# Patient Record
Sex: MALE | Race: WHITE | NOT HISPANIC OR LATINO | Employment: FULL TIME | URBAN - METROPOLITAN AREA
[De-identification: names, ages, dates, MRNs, and addresses within clinical notes are randomized per-mention and may not be internally consistent; named-entity substitution may affect disease eponyms.]

---

## 2021-05-21 ENCOUNTER — TRANSFERRED RECORDS (OUTPATIENT)
Dept: HEALTH INFORMATION MANAGEMENT | Facility: CLINIC | Age: 41
End: 2021-05-21

## 2021-05-21 LAB
ALT SERPL-CCNC: 68 U/L (ref 46–116)
AST SERPL-CCNC: 16 U/L (ref 15–37)
CREATININE (EXTERNAL): 0.81 MG/DL (ref 0.55–1.3)
GFR ESTIMATED (EXTERNAL): 107 ML/MIN/1.73M2
GFR ESTIMATED (IF AFRICAN AMERICAN) (EXTERNAL): 129 ML/MIN/1.73M2
GLUCOSE (EXTERNAL): 107 MG/DL (ref 70–99)
POTASSIUM (EXTERNAL): 4.2 MMOL/L (ref 3.5–5.1)

## 2022-03-25 ENCOUNTER — TRANSFERRED RECORDS (OUTPATIENT)
Dept: HEALTH INFORMATION MANAGEMENT | Facility: CLINIC | Age: 42
End: 2022-03-25

## 2022-03-29 ENCOUNTER — MEDICAL CORRESPONDENCE (OUTPATIENT)
Dept: HEALTH INFORMATION MANAGEMENT | Facility: CLINIC | Age: 42
End: 2022-03-29
Payer: COMMERCIAL

## 2022-04-05 ENCOUNTER — TRANSCRIBE ORDERS (OUTPATIENT)
Dept: OTHER | Age: 42
End: 2022-04-05
Payer: COMMERCIAL

## 2022-04-05 DIAGNOSIS — F64.0 GENDER DYSPHORIA IN ADULT: Primary | ICD-10-CM

## 2022-04-14 ENCOUNTER — TELEPHONE (OUTPATIENT)
Dept: PLASTIC SURGERY | Facility: CLINIC | Age: 42
End: 2022-04-14
Payer: COMMERCIAL

## 2022-04-14 NOTE — TELEPHONE ENCOUNTER
[facility] Call [facility]    Phone Message    May a detailed message be left on voicemail: yes     Reason for Call: Appointment Intake    Referring Provider Name: Goldberg, Gary Steven, MD  Diagnosis and/or Symptoms: Gender Dysphoria in Adult    Action Taken: Message routed to:  [facility]    Travel Screening: Not Applicable

## 2022-06-08 ENCOUNTER — TELEPHONE (OUTPATIENT)
Dept: PLASTIC SURGERY | Facility: CLINIC | Age: 42
End: 2022-06-08
Payer: COMMERCIAL

## 2022-06-08 NOTE — TELEPHONE ENCOUNTER
[facility] :  Care Coordination Note     SITUATION   Vivien, she/her, is a 41 year old adult who is receiving support for:  Call Back (Appointment )  .    BACKGROUND     Pt scheduled consultation with Dr. Deleon for breast augmentation consultation 7/22/22.     ASSESSMENT     Surgery              CGC Assessment  [facility] ([facility]GC) Enrollment: (P) Enrolled  Patient has a therapist: (P) No  Letter of support #1: (P) Requested  Surgery being considered: (P) Yes  Augmentation: (P) Yes  Hormones at least 12mo: (P) Yes          PLAN          Nursing Interventions:      Follow-up plan:    1. Obtain GLADYS Sweeney

## 2022-06-08 NOTE — TELEPHONE ENCOUNTER
[facility] Call Center    Phone Message    May a detailed message be left on voicemail: yes     Reason for Call: Other: Jeremiah Francisco is calling on behalf of the pt to help ot got contact for a gender care concsult. Please call pt back to schedule.      Action Taken: Message routed to:  [facility] Plastic surg    Travel Screening: Not Applicable

## 2022-06-19 ENCOUNTER — HEALTH MAINTENANCE LETTER (OUTPATIENT)
Age: 42
End: 2022-06-19

## 2022-07-28 ENCOUNTER — OFFICE VISIT (OUTPATIENT)
Dept: PLASTIC SURGERY | Facility: AMBULATORY SURGERY CENTER | Age: 42
End: 2022-07-28
Attending: UROLOGY
Payer: COMMERCIAL

## 2022-07-28 VITALS — DIASTOLIC BLOOD PRESSURE: 80 MMHG | SYSTOLIC BLOOD PRESSURE: 138 MMHG

## 2022-07-28 DIAGNOSIS — F64.0 GENDER DYSPHORIA IN ADULT: ICD-10-CM

## 2022-07-28 DIAGNOSIS — Z98.82 S/P AUGMENTATION MAMMOPLASTY: Primary | ICD-10-CM

## 2022-07-28 PROCEDURE — 99204 OFFICE O/P NEW MOD 45 MIN: CPT | Performed by: PLASTIC SURGERY

## 2022-07-28 NOTE — LETTER
7/28/2022         RE: Vishal Dia  [address] [facility] [address]        Dear Colleague,    Thank you for referring your patient, Vishal Dia, to the [facility]. Please see a copy of my visit note below.    Chief complaint:  Gender dysphoria, consultation for top surgery     History of present illness:  This is a 41 year old biological male transitioning to female who comes today for consultation regarding top surgery.  Patient's preferred pronouns she/her/hers.  Patient's name is Vivien.  At this time the letter of support is ready.   Currently Vivien is on estrogen treatment.  She has been receiving estrogen for the last 5 years. Vivien has been living on chosen gender for the last 5 years.     Past medical history:  History reviewed. No pertinent past medical history.    Gender dysphoria     Past surgical history:  Left foot surgery     Allergies:  Morphine, codeine, Demerol.  Patient states that she will take Tylenol extra strength for pain control.     Medications:  No current outpatient medications on file.     Family history:  Denies family history of cancer or bleeding disorders.     Social History:  Denies tobacco, drinks alcohol socially.     Review of systems:  General ROS: No complaints or constitutional symptoms  Skin: No complaints or symptoms   Hematologic/Lymphatic: No symptoms or complaints  Psychiatric: Patient presents with gender dysphoria  Endocrine: No excessive fatigue, no hypermetabolic symptoms reported  Respiratory ROS: No cough, shortness of breath, or wheezing  Cardiovascular ROS: No chest pain or dyspnea on exertion  Breast ROS: Denies nipple discharge, denies palpable breast masses, denies peau d'orange.  Gastrointestinal ROS: No abdominal pain, nausea, diarrhea, or constipation  Musculoskeletal ROS: No recent injuries reported  Neurological ROS: No focal neurologic defects reported.       Physical exam:     /80   General: Alert,  "cooperative, appears stated age   Skin: Skin color, texture, turgor normal, no rashes or lesions   Lymphatic: No obvious adenopathy, no swelling   Eyes: No scleral icterus, pupils equal  HENT: No traumatic injury to the head or face, no gross abnormalities  Lungs: Normal respiratory effort, breath sounds equal bilaterally  Heart: Regular rate and rhythm  Breasts:  Bilateral glandular hypertrophy, with grade 1 ptosis.  No nipple inversion, no nipple discharge, no palpable breast masses.    Left nipple areolar complex is slightly higher compared to the right one.  On the right breast sternal notch to nipple distance is 23 cm, nipple to inframammary fold is 6 cm, and breast diameter is 12 cm.  Of the left breast sternal notch to nipple distance is 22 cm, nipple to inframammary fold distance is 5 cm, and breast diameter is 12 cm.  Pinch thickness test on the skin of both breasts is 3 cm.  No palpable axillary lymphadenopathies bilaterally.  Abdomen: Soft, non-distended and non-tender to palpation  Neurologic: Grossly intact                                  Assessment:     41 year old biological male transitioning to female with history of gender dysphoria.     PLAN:      I believe that Vivien is a good candidate for gender affirming top surgery with bilateral prepectoral augmentation mammoplasties (her skin pinch test thickness is greater than 2 cm), via inframammary fold approach, with lowering of the inframammary fold at least 2 cm down from its current position (soft tissue rearrangement).  Furthermore, she will need obliteration of the current inframammary fold which is at least 1 cm higher from where is supposed to be.  Finally, she will need scoring of her bilateral glands.  Based upon her breast diameter I will bring to the operating room full profile, cohesive, breast implants with 520 cc, 485 cc, 450 cc and 415 cc.  I will utilize corresponding sizers as well.     \"According to [place] case law and [facility] " "standards of care, with an appropriate letter of support from a mental health provider, top surgery/mastectomy is medically necessary for the treatment of gender dysphoria.\"     I discussed with Vivien  the risks of surgery and they include but are not limited to scarring, large scar in the inframammary fold, keloid formation, infection requiring explantation and secondary augmentation at least 6 to 12 months after explantation, bleeding, hematoma, seroma, contour deformities, lack of sensation on the chest and nipple areolar complex, capsular contracture, implant malposition, need for further surgeries.     Patient did acknowledge all of these risks and has agreed to proceed.     We will schedule her for surgery: bilateral prepectoral augmentation mammoplasties with lowering of bilateral inframammary folds as a soft tissue rearrangement.     Time spent with the patient 45 minutes.       Abdon Deleon MD , FACS   Diplomate American Board of Plastic Surgery  Diplomate American Board of Surgery  Adj. Assistant Professor of Surgery  Division of Plastic & Reconstructive Surgery   [facility]  Office: [phone]   7/28/2022 at 10:55 AM         Again, thank you for allowing me to participate in the care of your patient.        Sincerely,        Abdon Deleon MD    "

## 2022-07-28 NOTE — PROGRESS NOTES
Chief complaint:  Gender dysphoria, consultation for top surgery     History of present illness:  This is a 41 year old biological male transitioning to female who comes today for consultation regarding top surgery.  Patient's preferred pronouns she/her/hers.  Patient's name is Vivien.  At this time the letter of support is ready.   Currently Vivien is on estrogen treatment.  She has been receiving estrogen for the last 5 years. Vivien has been living on chosen gender for the last 5 years.     Past medical history:  History reviewed. No pertinent past medical history.    Gender dysphoria     Past surgical history:  Left foot surgery     Allergies:  Morphine, codeine, Demerol.  Patient states that she will take Tylenol extra strength for pain control.     Medications:  No current outpatient medications on file.     Family history:  Denies family history of cancer or bleeding disorders.     Social History:  Denies tobacco, drinks alcohol socially.     Review of systems:  General ROS: No complaints or constitutional symptoms  Skin: No complaints or symptoms   Hematologic/Lymphatic: No symptoms or complaints  Psychiatric: Patient presents with gender dysphoria  Endocrine: No excessive fatigue, no hypermetabolic symptoms reported  Respiratory ROS: No cough, shortness of breath, or wheezing  Cardiovascular ROS: No chest pain or dyspnea on exertion  Breast ROS: Denies nipple discharge, denies palpable breast masses, denies peau d'orange.  Gastrointestinal ROS: No abdominal pain, nausea, diarrhea, or constipation  Musculoskeletal ROS: No recent injuries reported  Neurological ROS: No focal neurologic defects reported.       Physical exam:     /80   General: Alert, cooperative, appears stated age   Skin: Skin color, texture, turgor normal, no rashes or lesions   Lymphatic: No obvious adenopathy, no swelling   Eyes: No scleral icterus, pupils equal  HENT: No traumatic injury to the head or face, no gross  "abnormalities  Lungs: Normal respiratory effort, breath sounds equal bilaterally  Heart: Regular rate and rhythm  Breasts:  Bilateral glandular hypertrophy, with grade 1 ptosis.  No nipple inversion, no nipple discharge, no palpable breast masses.    Left nipple areolar complex is slightly higher compared to the right one.  On the right breast sternal notch to nipple distance is 23 cm, nipple to inframammary fold is 6 cm, and breast diameter is 12 cm.  Of the left breast sternal notch to nipple distance is 22 cm, nipple to inframammary fold distance is 5 cm, and breast diameter is 12 cm.  Pinch thickness test on the skin of both breasts is 3 cm.  No palpable axillary lymphadenopathies bilaterally.  Abdomen: Soft, non-distended and non-tender to palpation  Neurologic: Grossly intact                                  Assessment:     41 year old biological male transitioning to female with history of gender dysphoria.     PLAN:      I believe that Vivien is a good candidate for gender affirming top surgery with bilateral prepectoral augmentation mammoplasties (her skin pinch test thickness is greater than 2 cm), via inframammary fold approach, with lowering of the inframammary fold at least 2 cm down from its current position (soft tissue rearrangement).  Furthermore, she will need obliteration of the current inframammary fold which is at least 1 cm higher from where is supposed to be.  Finally, she will need scoring of her bilateral glands.  Based upon her breast diameter I will bring to the operating room full profile, cohesive, breast implants with 520 cc, 485 cc, 450 cc and 415 cc.  I will utilize corresponding sizers as well.     \"According to [place] case law and [facility] standards of care, with an appropriate letter of support from a mental health provider, top surgery/mastectomy is medically necessary for the treatment of gender dysphoria.\"     I discussed with Vivien  the risks of surgery and they include but are " not limited to scarring, large scar in the inframammary fold, keloid formation, infection requiring explantation and secondary augmentation at least 6 to 12 months after explantation, bleeding, hematoma, seroma, contour deformities, lack of sensation on the chest and nipple areolar complex, capsular contracture, implant malposition, need for further surgeries.     Patient did acknowledge all of these risks and has agreed to proceed.     We will schedule her for surgery: bilateral prepectoral augmentation mammoplasties with lowering of bilateral inframammary folds as a soft tissue rearrangement.     Time spent with the patient 45 minutes.       Abdon Deleon MD , FACS   Diplomate American Board of Plastic Surgery  Diplomate American Board of Surgery  Adj. Assistant Professor of Surgery  Division of Plastic & Reconstructive Surgery   [facility]  Office: [phone]   7/28/2022 at 10:55 AM

## 2022-09-30 ENCOUNTER — DOCUMENTATION ONLY (OUTPATIENT)
Dept: PLASTIC SURGERY | Facility: CLINIC | Age: 42
End: 2022-09-30

## 2022-09-30 NOTE — PROGRESS NOTES
[facility] :  Care Coordination Note     SITUATION   Vivien Dia is a 41 year old adult who is receiving support for:  No chief complaint on file.  .    BACKGROUND     Reviewed LOS. Missing irreversibility statement per [employer] policy. Sent pt a message indicating need for updated LOS.     ASSESSMENT     Surgery              CGC Assessment  [facility] ([facility]) Enrollment: Enrolled  Patient has a therapist: Yes  Name of therapist: Joselito Dennison  Letter of support #1: Received  Letter #1 Date: 07/12/22  Surgery being considered: Yes  Augmentation: Yes          PLAN          Nursing Interventions:       Follow-up plan:  Pt to upload updated letter, surgery to be scheduled.        LIANET MONTGOMERY LP[facility]

## 2022-10-19 ENCOUNTER — MEDICAL CORRESPONDENCE (OUTPATIENT)
Dept: HEALTH INFORMATION MANAGEMENT | Facility: CLINIC | Age: 42
End: 2022-10-19

## 2022-11-04 ENCOUNTER — TELEPHONE (OUTPATIENT)
Dept: PLASTIC SURGERY | Facility: AMBULATORY SURGERY CENTER | Age: 42
End: 2022-11-04

## 2022-11-04 PROBLEM — F64.0 GENDER DYSPHORIA IN ADULT: Status: ACTIVE | Noted: 2022-11-04

## 2022-11-04 NOTE — LETTER
We've received instruction to get you scheduled for surgery with Dr Deleon. We have that arranged as follows:     Pre-op Physical:  Call your primary clinic to schedule.    Surgery Date: 12/7/2022     [facility]  [address], [address], [place]  [address]    Approximate Arrival Time: 8:00 am  (Unless instructed differently by the pre-op call nurse)     Post op Appointment: 12/9/2022 at 4:00 pm at Dr Deleon's office in [place].  [facility], [address], [place] [address].    Prep Tasks and Info:     1. Schedule a pre-op physical with your primary care doctor. This must be within 30 days of surgery and since it required by anesthesia, your surgery will be cancelled if it's not done. Call your clinic asap to get this scheduled.    2. Review your medications with your primary care or prescribing physician; they will advise you which meds to stop and when, and when you can resume taking.  Certain medications like blood thinners need to be stopped in advance of surgery to proceed safely.    3. A home rapid antigen Covid-19 test is required 1-2 days before surgery- regardless of your vaccination status.  Take a photo of the negative result and show to the nurse on the day of surgery. If you test positive, contact our office right away to reschedule surgery. You can buy a home Covid-19 Rapid Antigen test at many local pharmacies, or you can order for free at covid.gov/tests.    4. Please shower the evening before and morning of surgery with Hibiclens or Exidine soap.  This can be found at your local pharmacy.    5. Fasting instructions will be provided by the pre-op nurse who will call you 1-3 days before surgery.  Typically we advise normal food up to 8 hours before surgery then clear liquids only up to 1 hour before surgery then nothing at all by mouth for 2 hours including no gum or  candy.  The nurse will review your specific instructions with you at the call.      6. Smoking impacts your body's ability to heal properly.  Dr Deleon patients are required to be nicotine free for 6-8 weeks before surgery.      7. You will need an adult to drive you home and stay with you 24 hours after surgery. Public transportation or Medical Van Services are not permitted.    8. You may have two visitors wait in the lobby at the surgery center during your surgery. Visitor restrictions are subject to change, please verify with the pre-op nurse when they call.    9. If the community sees a new COVID19 surge, your procedure may need to be postponed. We will contact you if this happens. You will be screened for high-risk exposure to Covid-19 during the pre-op call.  We encourage you to quarantine yourself away from any Covid-19 people for 10 days before surgery to avoid possible last minute cancellations.   When you arrive to the surgery center, you will again be screened for COVID19 symptoms. If you screen positive, your surgery will need to be postponed.    10. We always encourage you to notify your insurance any time you have medical tests or procedures scheduled including surgery. The number is usually right on the back of your insurance card. Please call [facility] Cost of Care at [phone] if you'd like a surgery quote.       Call our office if you have any questions! Thank you!

## 2022-11-04 NOTE — TELEPHONE ENCOUNTER
Spoke with patient today regarding surgery scheduling     Went over details/instructions.    Surgery Letter sent via [employer]

## 2022-11-19 ENCOUNTER — HEALTH MAINTENANCE LETTER (OUTPATIENT)
Age: 42
End: 2022-11-19

## 2022-12-04 ENCOUNTER — MYC MEDICAL ADVICE (OUTPATIENT)
Dept: SURGERY | Facility: AMBULATORY SURGERY CENTER | Age: 42
End: 2022-12-04

## 2022-12-04 RX ORDER — ONDANSETRON 4 MG/1
4 TABLET, FILM COATED ORAL EVERY 6 HOURS PRN
Qty: 12 TABLET | Refills: 0 | Status: SHIPPED | OUTPATIENT
Start: 2022-12-04

## 2022-12-04 RX ORDER — DOCUSATE SODIUM 100 MG/1
100 CAPSULE, LIQUID FILLED ORAL 2 TIMES DAILY
Qty: 20 CAPSULE | Refills: 0 | Status: SHIPPED | OUTPATIENT
Start: 2022-12-04

## 2022-12-04 RX ORDER — CEPHALEXIN 500 MG/1
500 CAPSULE ORAL 3 TIMES DAILY
Qty: 21 CAPSULE | Refills: 0 | Status: SHIPPED | OUTPATIENT
Start: 2022-12-04 | End: 2022-12-11

## 2022-12-04 RX ORDER — MUPIROCIN 20 MG/G
OINTMENT TOPICAL 3 TIMES DAILY
Qty: 30 G | Refills: 0 | Status: SHIPPED | OUTPATIENT
Start: 2022-12-04 | End: 2022-12-07

## 2022-12-05 NOTE — PROGRESS NOTES
Preop medications has been prescribed.    Abdon Deleon MD , FACS   Diplomate American Board of Plastic Surgery  Diplomate American Board of Surgery  Adj. Assistant Professor of Surgery  Division of Plastic & Reconstructive Surgery   [facility]  Office: [phone]   12/4/2022 at 6:47 PM

## 2022-12-06 ENCOUNTER — ANESTHESIA EVENT (OUTPATIENT)
Dept: SURGERY | Facility: AMBULATORY SURGERY CENTER | Age: 42
End: 2022-12-06
Payer: COMMERCIAL

## 2022-12-07 ENCOUNTER — HOSPITAL ENCOUNTER (OUTPATIENT)
Facility: AMBULATORY SURGERY CENTER | Age: 42
Discharge: HOME | End: 2022-12-07
Attending: PLASTIC SURGERY | Admitting: PLASTIC SURGERY
Payer: COMMERCIAL

## 2022-12-07 ENCOUNTER — ANESTHESIA (OUTPATIENT)
Dept: SURGERY | Facility: AMBULATORY SURGERY CENTER | Age: 42
End: 2022-12-07
Payer: COMMERCIAL

## 2022-12-07 VITALS
RESPIRATION RATE: 16 BRPM | HEIGHT: 73 IN | OXYGEN SATURATION: 98 % | BODY MASS INDEX: 26.51 KG/M2 | TEMPERATURE: 96.3 F | DIASTOLIC BLOOD PRESSURE: 73 MMHG | HEART RATE: 72 BPM | WEIGHT: 200 LBS | SYSTOLIC BLOOD PRESSURE: 124 MMHG

## 2022-12-07 DIAGNOSIS — F64.0 GENDER DYSPHORIA IN ADULT: ICD-10-CM

## 2022-12-07 PROCEDURE — 19325 BREAST AUGMENTATION W/IMPLT: CPT | Mod: 50

## 2022-12-07 PROCEDURE — 14001 TIS TRNFR TRUNK 10.1-30SQCM: CPT | Mod: XS

## 2022-12-07 PROCEDURE — 19325 BREAST AUGMENTATION W/IMPLT: CPT | Mod: 50 | Performed by: PLASTIC SURGERY

## 2022-12-07 PROCEDURE — 14001 TIS TRNFR TRUNK 10.1-30SQCM: CPT | Mod: GC | Performed by: PLASTIC SURGERY

## 2022-12-07 DEVICE — IMPLANTABLE DEVICE: Type: IMPLANTABLE DEVICE | Site: BREAST | Status: FUNCTIONAL

## 2022-12-07 RX ORDER — ACETAMINOPHEN 325 MG/1
975 TABLET ORAL ONCE
Status: COMPLETED | OUTPATIENT
Start: 2022-12-07 | End: 2022-12-07

## 2022-12-07 RX ORDER — FENTANYL CITRATE 50 UG/ML
25 INJECTION, SOLUTION INTRAMUSCULAR; INTRAVENOUS EVERY 5 MIN PRN
Status: DISCONTINUED | OUTPATIENT
Start: 2022-12-07 | End: 2022-12-08 | Stop reason: HOSPADM

## 2022-12-07 RX ORDER — PROPOFOL 10 MG/ML
INJECTION, EMULSION INTRAVENOUS PRN
Status: DISCONTINUED | OUTPATIENT
Start: 2022-12-07 | End: 2022-12-07

## 2022-12-07 RX ORDER — ONDANSETRON 4 MG/1
4 TABLET, ORALLY DISINTEGRATING ORAL EVERY 30 MIN PRN
Status: DISCONTINUED | OUTPATIENT
Start: 2022-12-07 | End: 2022-12-08 | Stop reason: HOSPADM

## 2022-12-07 RX ORDER — FENTANYL CITRATE 50 UG/ML
INJECTION, SOLUTION INTRAMUSCULAR; INTRAVENOUS PRN
Status: DISCONTINUED | OUTPATIENT
Start: 2022-12-07 | End: 2022-12-07

## 2022-12-07 RX ORDER — PROPOFOL 10 MG/ML
INJECTION, EMULSION INTRAVENOUS CONTINUOUS PRN
Status: DISCONTINUED | OUTPATIENT
Start: 2022-12-07 | End: 2022-12-07

## 2022-12-07 RX ORDER — CEFAZOLIN SODIUM 2 G/50ML
2 SOLUTION INTRAVENOUS
Status: COMPLETED | OUTPATIENT
Start: 2022-12-07 | End: 2022-12-07

## 2022-12-07 RX ORDER — GLYCOPYRROLATE 0.2 MG/ML
INJECTION, SOLUTION INTRAMUSCULAR; INTRAVENOUS PRN
Status: DISCONTINUED | OUTPATIENT
Start: 2022-12-07 | End: 2022-12-07

## 2022-12-07 RX ORDER — FENTANYL CITRATE 50 UG/ML
50 INJECTION, SOLUTION INTRAMUSCULAR; INTRAVENOUS EVERY 5 MIN PRN
Status: DISCONTINUED | OUTPATIENT
Start: 2022-12-07 | End: 2022-12-08 | Stop reason: HOSPADM

## 2022-12-07 RX ORDER — DEXAMETHASONE SODIUM PHOSPHATE 4 MG/ML
INJECTION, SOLUTION INTRA-ARTICULAR; INTRALESIONAL; INTRAMUSCULAR; INTRAVENOUS; SOFT TISSUE PRN
Status: DISCONTINUED | OUTPATIENT
Start: 2022-12-07 | End: 2022-12-07

## 2022-12-07 RX ORDER — MEPERIDINE HYDROCHLORIDE 25 MG/ML
12.5 INJECTION INTRAMUSCULAR; INTRAVENOUS; SUBCUTANEOUS
Status: DISCONTINUED | OUTPATIENT
Start: 2022-12-07 | End: 2022-12-08 | Stop reason: HOSPADM

## 2022-12-07 RX ORDER — ONDANSETRON 2 MG/ML
4 INJECTION INTRAMUSCULAR; INTRAVENOUS EVERY 30 MIN PRN
Status: DISCONTINUED | OUTPATIENT
Start: 2022-12-07 | End: 2022-12-08 | Stop reason: HOSPADM

## 2022-12-07 RX ORDER — HYDROMORPHONE HYDROCHLORIDE 1 MG/ML
0.2 INJECTION, SOLUTION INTRAMUSCULAR; INTRAVENOUS; SUBCUTANEOUS EVERY 5 MIN PRN
Status: DISCONTINUED | OUTPATIENT
Start: 2022-12-07 | End: 2022-12-08 | Stop reason: HOSPADM

## 2022-12-07 RX ORDER — SODIUM CHLORIDE, SODIUM LACTATE, POTASSIUM CHLORIDE, CALCIUM CHLORIDE 600; 310; 30; 20 MG/100ML; MG/100ML; MG/100ML; MG/100ML
INJECTION, SOLUTION INTRAVENOUS CONTINUOUS
Status: DISCONTINUED | OUTPATIENT
Start: 2022-12-07 | End: 2022-12-08 | Stop reason: HOSPADM

## 2022-12-07 RX ORDER — SODIUM CHLORIDE, SODIUM LACTATE, POTASSIUM CHLORIDE, CALCIUM CHLORIDE 600; 310; 30; 20 MG/100ML; MG/100ML; MG/100ML; MG/100ML
INJECTION, SOLUTION INTRAVENOUS CONTINUOUS
Status: DISCONTINUED | OUTPATIENT
Start: 2022-12-07 | End: 2022-12-07 | Stop reason: HOSPADM

## 2022-12-07 RX ORDER — GABAPENTIN 300 MG/1
300 CAPSULE ORAL
Status: COMPLETED | OUTPATIENT
Start: 2022-12-07 | End: 2022-12-07

## 2022-12-07 RX ORDER — ONDANSETRON 2 MG/ML
INJECTION INTRAMUSCULAR; INTRAVENOUS PRN
Status: DISCONTINUED | OUTPATIENT
Start: 2022-12-07 | End: 2022-12-07

## 2022-12-07 RX ORDER — HYDROMORPHONE HYDROCHLORIDE 1 MG/ML
0.4 INJECTION, SOLUTION INTRAMUSCULAR; INTRAVENOUS; SUBCUTANEOUS EVERY 5 MIN PRN
Status: DISCONTINUED | OUTPATIENT
Start: 2022-12-07 | End: 2022-12-08 | Stop reason: HOSPADM

## 2022-12-07 RX ORDER — LIDOCAINE HYDROCHLORIDE 20 MG/ML
INJECTION, SOLUTION INFILTRATION; PERINEURAL PRN
Status: DISCONTINUED | OUTPATIENT
Start: 2022-12-07 | End: 2022-12-07

## 2022-12-07 RX ORDER — LIDOCAINE 40 MG/G
CREAM TOPICAL
Status: DISCONTINUED | OUTPATIENT
Start: 2022-12-07 | End: 2022-12-07 | Stop reason: HOSPADM

## 2022-12-07 RX ORDER — FENTANYL CITRATE 50 UG/ML
25 INJECTION, SOLUTION INTRAMUSCULAR; INTRAVENOUS
Status: DISCONTINUED | OUTPATIENT
Start: 2022-12-07 | End: 2022-12-08 | Stop reason: HOSPADM

## 2022-12-07 RX ORDER — DEXMEDETOMIDINE HYDROCHLORIDE 4 UG/ML
INJECTION, SOLUTION INTRAVENOUS PRN
Status: DISCONTINUED | OUTPATIENT
Start: 2022-12-07 | End: 2022-12-07

## 2022-12-07 RX ADMIN — ACETAMINOPHEN 975 MG: 325 TABLET ORAL at 10:18

## 2022-12-07 RX ADMIN — DEXMEDETOMIDINE HYDROCHLORIDE 8 MCG: 4 INJECTION, SOLUTION INTRAVENOUS at 12:06

## 2022-12-07 RX ADMIN — FENTANYL CITRATE 50 MCG: 50 INJECTION, SOLUTION INTRAMUSCULAR; INTRAVENOUS at 11:25

## 2022-12-07 RX ADMIN — ONDANSETRON 4 MG: 2 INJECTION INTRAMUSCULAR; INTRAVENOUS at 13:42

## 2022-12-07 RX ADMIN — GLYCOPYRROLATE 0.2 MG: 0.2 INJECTION, SOLUTION INTRAMUSCULAR; INTRAVENOUS at 11:25

## 2022-12-07 RX ADMIN — DEXMEDETOMIDINE HYDROCHLORIDE 8 MCG: 4 INJECTION, SOLUTION INTRAVENOUS at 11:59

## 2022-12-07 RX ADMIN — CEFAZOLIN SODIUM 2 G: 2 SOLUTION INTRAVENOUS at 11:20

## 2022-12-07 RX ADMIN — DEXAMETHASONE SODIUM PHOSPHATE 4 MG: 4 INJECTION, SOLUTION INTRA-ARTICULAR; INTRALESIONAL; INTRAMUSCULAR; INTRAVENOUS; SOFT TISSUE at 11:46

## 2022-12-07 RX ADMIN — Medication 0.5 MG: at 13:49

## 2022-12-07 RX ADMIN — PROPOFOL 200 MG: 10 INJECTION, EMULSION INTRAVENOUS at 11:27

## 2022-12-07 RX ADMIN — LIDOCAINE HYDROCHLORIDE 100 MG: 20 INJECTION, SOLUTION INFILTRATION; PERINEURAL at 11:27

## 2022-12-07 RX ADMIN — GABAPENTIN 300 MG: 300 CAPSULE ORAL at 10:18

## 2022-12-07 RX ADMIN — PROPOFOL 150 MCG/KG/MIN: 10 INJECTION, EMULSION INTRAVENOUS at 11:28

## 2022-12-07 RX ADMIN — Medication 0.5 MG: at 11:54

## 2022-12-07 RX ADMIN — DEXMEDETOMIDINE HYDROCHLORIDE 8 MCG: 4 INJECTION, SOLUTION INTRAVENOUS at 12:13

## 2022-12-07 RX ADMIN — SODIUM CHLORIDE, SODIUM LACTATE, POTASSIUM CHLORIDE, CALCIUM CHLORIDE: 600; 310; 30; 20 INJECTION, SOLUTION INTRAVENOUS at 10:19

## 2022-12-07 NOTE — TELEPHONE ENCOUNTER
Call placed to patient. Patient was notified that surgery was still set to happen and that they needed to head to the outpatient surgery center in [place] if they had not left already. The patient reported they were on the way. Dr. Deleon was also notified.     Lucina Casanova RN on 12/7/2022 at 9:21 AM

## 2022-12-07 NOTE — OP NOTE
Vishal Dia    December 7, 2022    Preoperative diagnosis: Gender dysphoria     Postoperative diagnosis: same     Procedure:      1) Bilateral augmentation mammoplasties in the prepectoral space space.     For the right breast a 485 ml Allergan full profile implant was utilized.  Reference SCF-485 and SN [id number].    For the left breast a 520 ml Allergan full profile implant was utilized.  Reference SCF-520 and SN [id number].     2) Adjacent tissue transfer on the right breast (lowering of the inframammary fold) 12 cm .  This was given by 1 cm in the vertical plane and 12 cm on the horizontal plane (inframammary fold) for a total of 12  cm .     3) Adjacent tissue transfer on the left breast (lowering of the inframammary fold) 24 cm .  This was given by 2 cm in the vertical plane and 12 cm on the horizontal plane (inframammary fold) for a total of 24 cm .      Surgeon: Abdon Deleon MD.     Assistants:     1) Mainor Kearney MD, plastic surgery resident  2) Kimberly Medina CSA      Anesthesia: General     IV fluids:  600 ml     Findings: Macroscopically normal-appearing breasts.       Specimen: None     Drains: None     Disposition: Patient tolerated procedure well and was extubated and transferred to recovery room awake and in stable condition.     Indications:     This is 42 year old biological male with diagnosis of gender dysphoria.  The patient met [facility]PATH and insurance criteria for gender affirming top surgery.  Patient wishes to undergo bilateral augmentation mammoplasties.  Based upon patient's anatomy and skin thickness, the breast implants will be placed in the prepectoral space.  Also, it is possible that one or both inframammary folds (IMF) will have to be lowered in order to ensure proper position of the breast implant behind each nipple areolar complex (NAC).     Risks were explained to the patient and they include but are not limited to: scarring, infection, potential explantation of one or both  "implants, bleeding, hematoma, seroma, temporary or permanent altered sensation on breast's skin and NAC, implant malposition, breast asymmetry, asymmetry in the location of the IMF, capsular contracture, rippling, animation deformity, the pneumothorax, chest tube placement, need for further surgeries.  Patient has acknowledged all these risks and has agreed to proceed signing informed consent.     Procedure:     Patient was identified in the preoperative holding area and preoperative IV antibiotic was given to the patient.    I then proceeded to perform preoperative markings on the patient's chest.  First I draw the midline, I also marked 2 cm bilaterally and lateral to the midline on each medial aspect of patient's breast in order to make sure that the dissection on the breast pocket will stay 2 cm laterally from the midline on each side.  This will prevent symmastia.  Also, I marked the breast footprint on the anterior axillary line bilaterally in order to make sure that dissection of the breast pocket will not go beyond this line.  Furthermore, I marked the breast meridian on each breast as well as the native inframammary fold (IMF) of each breast.  I also measured the distance from the NAC to the IMF on each breast.     On this particular patient, the NAC to IMF distance on the right breast was 6 cm and said distance on the left breast was 5 cm.  The ideal distance between the NAC to the IMF is around 7 to 8 cm, therefore, the IMF on both breasts will need to be lower approximately 1-2 cm, respectively.  New IMF was marked on each breast.     I also marked the native IMF on both breasts. This native IMF will have to be obliterated in order to prevent \"double bubble deformity\".    The proposed incision on each inframammary fold was 6 cm length, 3 cm to each side of the breast meridian at the level of the new IMF.  This will ensure proper exposure and it will facilitate recreation of the new IMF.    I also marked " the superior aspect of bilateral breast footprint.  Finally, I also marked the circumferential footprint of the breast gland itself bilaterally.     This is how the preoperative markings looked like:                     After completing these markings, patient was then brought to the operating room and was placed supine on the operating room table.  Sequential compression devices were activated and general endotracheal anesthesia was obtained.  Then, the neck, chest and upper abdomen were prepped and draped in sterile surgical fashion.    With scalpel #15 I proceeded to make incision on the right breast IMF following the preoperative markings.  Subcutaneous tissues were divided with electrocautery until I reach the fascia of the pectoralis major muscle.  Utilizing light retractor, I proceeded to dissect the prepectoral pocket following the preoperative markings.  Once I have created the prepectoral space utilizing the defined preoperative markings, I proceeded to score with electrocautery, the underlying surface of the breast gland in order to prevent double bubble deformity when this gland would sit on top of the implant.     Copious irrigation with antibiotic solution was provided and I found that the hemostasis was excellent.  At this time I packed this space with a moist sponge and proceeded to perform the above mentioned steps on the contralateral breast.     On both breasts, the incision was made in the new proposed IMF which was parallel and in the same plane of the contralateral breast's IMF.  These incisions were 2 cm inferior to the native IMF.  This was done in order to ensure symmetry between both breasts. I then proceeded to obliterate both breast's native IMF scoring them with electrocautery.  This will prevent double bubble deformity as well. The length of this IMF was 12 cm on the right breast and 12 cm on the left breast.       The total square area for the adjacent tissue transfer was 12 cm  on the  right breast and 24 cm  on the left breast.    Copious irrigation was provided as well with antibiotic solution, and I found that hemostasis was excellent.  The pocket was packed with a moist sterile sponge.    At this time I inspected the right breast and confirmed excellent hemostasis.  Utilizing patient's preoperative breast diameter, I brought a 485 ml full profile sizer which was introduced on this breast.  I also proceeded to remove the contralateral breast sponge and confirmed excellent hemostasis there as well.  I also introduced a second sizer of the same size on this breast. However, I noticed that the projection on the left breast was less pronounced compared to the right breast. Therefore, I replaced the 485 ml sizer for a 520 ml sizer.    Patient was then sat up and I confirmed excellent symmetry on both breasts and an harmonious relationship between the breast implant and patient's chest frame.  Therefore, I decided to utilize those volumes for the permanent breast implants.    Patient was then placed back in the supine position, and with the sizers in place, I proceeded to apply the stitches to close the bilateral inframammary folds.  I applied three central interrupted stitches on the newly developed bilateral inframammary folds utilizing 2-0 PDS.  These stitches grasped the breast capsule and were anchored into the periosteum of the underlying rib.  This was very important to prevent implant malposition and bottoming out . I also applied multiple 3-0 Vicryl simple interrupted stitches next to the PDS stitches in order to bolster the closure.  The ends of each stitch was secured with multiple hemostats.    After all the stitches were placed, then the sizers were retrieved.  With this technique, I ensured that no needles will be in contact when the permanent implants are placed.     At this time, I proceeded to irrigate again both cavities with antibiotic solution followed by clorehexidine solution.   "I also proceeded to repaint the chest with cloherxidine and applied new sterile towels on the field.  Our scrub tech, my assistants and I proceeded to change to new gloves and utilizing a Estrada funnel, the permanent breast implants which were bathed in antibiotic solution, where inserted in bilateral breast pockets with the \"no touch\" technique.     All the previously applied stitches were then tied down and bilateral IMF were closed.  Subcutaneous tissues were approximated with multiple 3-0 and 4-0 Monocryl buried interrupted stitches.  Finally the skin was closed with 5-0 Monocryl running subcuticular stitches.  Steri-strip were applied on top of each incision, followed by Mastisol and a medium size Tegaderm as dressing.  Patient also received a supporting a sports bra.    Instrument count was reported by nursing personnel as correct.  Patient tolerated procedure well and was extubated and transferred to recovery room awake and in stable condition.    Abdon Deleon MD , FACS   Diplomate American Board of Plastic Surgery  Diplomate American Board of Surgery  Adj. Assistant Professor of Surgery  Division of Plastic & Reconstructive Surgery   [facility]  Office: [phone]   12/7/2022 at 2:10 PM     "

## 2022-12-07 NOTE — ANESTHESIA PROCEDURE NOTES
Airway    Staff -        CRNA: Lela Hurd APRN CRNA       Performed By: CRNAIndications and Patient Condition       Indications for airway management: kimberly-procedural       Induction type:intravenous       Mask difficulty assessment: 1 - vent by mask    Final Airway Details       Final airway type: supraglottic airway    Supraglottic Airway Details        Type: LMA       Brand: I-Gel       LMA size: 5    Post intubation assessment        Placement verified by: capnometry, equal breath sounds and chest rise        Number of attempts at approach: 1       Number of other approaches attempted: 0       Secured with: pink tape       Ease of procedure: easy       Dentition: Intact and Unchanged

## 2022-12-07 NOTE — ANESTHESIA PREPROCEDURE EVALUATION
Anesthesia Pre-Procedure Evaluation    Patient: Vishal Dia   MRN: [id number] : 1980        Procedure : Procedure(s):  BILATERAL BREAST AUGMENTATION  ADJACENT TISSUE TRANSFER OR REARRANGEMENT, BILATERAL INFRAMMAMARY FOLDS: 12 sq CM RIGHT INFRAMMAMARY FOLD and 24 sq CM LEFT INFRAMMAMARY FOLD          History reviewed. No pertinent past medical history.   No past surgical history on file.   Allergies   Allergen Reactions     Codeine      Demerol Hcl [Meperidine]      Morphine       Social History     Tobacco Use     Smoking status: Former     Types: Cigarettes     Quit date: 3/1/2022     Years since quittin.7     Smokeless tobacco: Never   Substance Use Topics     Alcohol use: Not on file      Wt Readings from Last 1 Encounters:   22 90.7 kg (200 lb)        Anesthesia Evaluation            ROS/MED HX  ENT/Pulmonary:  - neg pulmonary ROS     Neurologic:  - neg neurologic ROS     Cardiovascular:  - neg cardiovascular ROS     METS/Exercise Tolerance:     Hematologic:  - neg hematologic  ROS     Musculoskeletal:  - neg musculoskeletal ROS     GI/Hepatic:  - neg GI/hepatic ROS     Renal/Genitourinary:  - neg Renal ROS     Endo:  - neg endo ROS     Psychiatric/Substance Use:  - neg psychiatric ROS     Infectious Disease:  - neg infectious disease ROS     Malignancy:  - neg malignancy ROS     Other:               OUTSIDE LABS:  CBC: No results found for: WBC, HGB, HCT, PLT  BMP: No results found for: NA, POTASSIUM, CHLORIDE, CO2, BUN, CR, GLC  COAGS: No results found for: PTT, INR, FIBR  POC: No results found for: BGM, HCG, HCGS  HEPATIC: No results found for: ALBUMIN, PROTTOTAL, ALT, AST, GGT, ALKPHOS, BILITOTAL, BILIDIRECT, ADAN  OTHER: No results found for: PH, LACT, A1C, PREETHI, PHOS, MAG, LIPASE, AMYLASE, TSH, T4, T3, CRP, SED    Anesthesia Plan    ASA Status:  1      Anesthesia Type: General.     - Airway: LMA              Consents    Anesthesia Plan(s) and associated risks, benefits, and realistic  alternatives discussed. Questions answered and patient/representative(s) expressed understanding.     - Discussed: Risks, Benefits and Alternatives for BOTH SEDATION and the PROCEDURE were discussed     - Discussed with:  Patient      - Extended Intubation/Ventilatory Support Discussed: No.      - Patient is DNR/DNI Status: No    Use of blood products discussed: No .     Postoperative Care    Pain management: IV analgesics, Oral pain medications.   PONV prophylaxis: Ondansetron (or other 5HT-3), Dexamethasone or Solumedrol     Comments:                Tiburcio Nelson MD, MD

## 2022-12-07 NOTE — ANESTHESIA POSTPROCEDURE EVALUATION
Patient: Vishal Dia    Procedure: Procedure(s):  BILATERAL BREAST AUGMENTATION  ADJACENT TISSUE TRANSFER OR REARRANGEMENT, BILATERAL INFRAMMAMARY FOLDS: 12 sq CM RIGHT INFRAMMAMARY FOLD and 24 sq CM LEFT INFRAMMAMARY FOLD       Anesthesia Type:  General    Note:  Disposition: Outpatient   Postop Pain Control: Uneventful            Sign Out: Well controlled pain   PONV: No   Neuro/Psych: Uneventful            Sign Out: Acceptable/Baseline neuro status   Airway/Respiratory: Uneventful            Sign Out: Acceptable/Baseline resp. status   CV/Hemodynamics: Uneventful            Sign Out: Acceptable CV status; No obvious hypovolemia; No obvious fluid overload   Other NRE: NONE   DID A NON-ROUTINE EVENT OCCUR? No           Last vitals:  Vitals Value Taken Time   /85 12/07/22 1407   Temp 36.1  C (97  F) 12/07/22 1407   Pulse 64 12/07/22 1411   Resp 5 12/07/22 1411   SpO2 96 % 12/07/22 1411   Vitals shown include unvalidated device data.    Electronically Signed By: Tiburcio Nelson MD, MD  December 7, 2022  2:12 PM

## 2022-12-07 NOTE — ANESTHESIA CARE TRANSFER NOTE
Patient: Vishal Dia    Procedure: Procedure(s):  BILATERAL BREAST AUGMENTATION  ADJACENT TISSUE TRANSFER OR REARRANGEMENT, BILATERAL INFRAMMAMARY FOLDS: 12 sq CM RIGHT INFRAMMAMARY FOLD and 24 sq CM LEFT INFRAMMAMARY FOLD       Diagnosis: Gender dysphoria in adult [F64.0]  Diagnosis Additional Information: No value filed.    Anesthesia Type:   General     Note:    Oropharynx: spontaneously breathing and oropharynx clear of all foreign objects  Level of Consciousness: awake  Oxygen Supplementation: nasal cannula  Level of Supplemental Oxygen (L/min / FiO2): 2  Independent Airway: airway patency satisfactory and stable  Dentition: dentition unchanged  Vital Signs Stable: post-procedure vital signs reviewed and stable  Report to RN Given: handoff report given  Patient transferred to: PACU  Comments: Resps easy and regular. Report to PACU RN  Handoff Report: Identifed the Patient, Identified the Reponsible Provider, Reviewed the pertinent medical history, Discussed the surgical course, Reviewed Intra-OP anesthesia mangement and issues during anesthesia, Set expectations for post-procedure period and Allowed opportunity for questions and acknowledgement of understanding      Vitals:  Vitals Value Taken Time   /85 12/07/22 1407   Temp 36.1  C (97  F) 12/07/22 1407   Pulse 64 12/07/22 1411   Resp 5 12/07/22 1411   SpO2 96 % 12/07/22 1411   Vitals shown include unvalidated device data.    Electronically Signed By: HIRA CHANG CRNA  December 7, 2022  2:12 PM

## 2022-12-07 NOTE — DISCHARGE INSTRUCTIONS
"[facility]  Home Care Following Anesthesia  For 24 hours after surgery:  Get plenty of rest.  A responsible adult must stay with you for at least 24 hours after you leave the surgery center.  Do not drive or use heavy equipment.  If you have weakness or tingling, don't drive or use heavy equipment until this feeling goes away.   Do not drink alcohol.   Avoid strenuous or risky activities.  Ask for help when climbing stairs.  You may feel lightheaded.  IF so, sit for a few minutes before standing.  Have someone help you get up.   If you have nausea (feel sick to your stomach): Drink only clear liquids such as apple juice, ginger ale, broth or 7-Up.  Rest may also help.  Be sure to drink enough fluids.  Move to a regular diet as you feel able.   You may have a slight fever.  Call the doctor if your fever is over 100 F (37.7 C) (taken under the tongue) or lasts longer than 24 hours.  You may have a dry mouth, a sore throat, muscle aches or trouble sleeping. These should go away after 24 hours.  Do not make important or legal decisions.   It is recommended to avoid smoking.        Today you received a Marcaine or bupivacaine block to numb the nerves near your surgery site.  This is a block using local anesthetic or \"numbing\" medication injected around the nerves to anesthetize or \"numb\" the area supplied by those nerves.  This block is injected into the muscle layer near your surgical site.  The medication may numb the location where you had surgery for 6-18 hours, but may last up to 24 hours.  If your surgical site is an arm or leg you should be careful with your affected limb, since it is possible to injure your limb without being aware of it due to the numbing.  Until full feeling returns, you should guard against bumping or hitting your limb, and avoid extreme hot or cold temperatures on the skin.  As the block wears off, the feeling will return as a tingling or prickly " sensation near your surgical site.  You will experience more discomfort from your incision as the feeling returns.  You may want to take a pain pill (a narcotic or Tylenol if this was prescribed by your surgeon) when you start to experience mild pain before the pain beccomes more severe.  If your pain medications do not control your pain you should notifiy your surgeon.    Tips for taking pain medications  To get the best pain relief possible, remember these points:  Take pain medications as directed, before pain becomes severe.  Pain medication can upset your stomach: taking it with food may help.  Constipation is a common side effect of pain medication. Drink plenty of  fluids.  Eat foods high in fiber. Take a stool softener if recommended by your doctor or pharmacist.  Do not drink alcohol, drive or operate machinery while taking pain medications.  Ask about other ways to control pain, such as with heat, ice or relaxation.    Tylenol/Acetaminophen Consumption  To help encourage the safe use of acetaminophen, the makers of TYLENOL  have lowered the maximum daily dose for single-ingredient Extra Strength TYLENOL  (acetaminophen) products sold in the [place] from 8 pills per day (4,000 mg) to 6 pills per day (3,000 mg). The dosing interval has also changed from 2 pills every 4-6 hours to 2 pills every 6 hours.  If you feel your pain relief is insufficient, you may take Tylenol/Acetaminophen in addition to your narcotic pain medication.   Be careful not to exceed 3,000 mg of Tylenol/Acetaminophen in a 24 hour period from all sources.  If you are taking extra strength Tylenol/acetaminophen (500 mg), the maximum dose is 6 tablets in 24 hours.  If you are taking regular strength acetaminophen (325 mg), the maximum dose is 9 tablets in 24 hours.    Call a doctor for any of the following:  Signs of infection (fever, growing tenderness at the surgery site, a large amount of drainage or bleeding, severe pain, foul-smelling  drainage, redness, swelling).  It has been over 8 to 10 hours since surgery and you are still not able to urinate (pass water).  Headache for over 24 hours.  Numbness, tingling or weakness the day after surgery (if you had spinal anesthesia).  Signs of Covid-19 infection (temperature over 100 degrees, shortness of breath, cough, loss of taste/smell, generalized body aches, persistent headache, chills, sore throat, nausea/vomiting/diarrhea)  Your doctor is:       Dr. Abdon Deleon, Plastic Surgery: [phone]               Or dial [phone] and ask for the resident on call for:  Plastics  For emergency care, call the:  [facility]:  [phone] (TTY for hearing impaired: [phone])

## 2022-12-09 ENCOUNTER — OFFICE VISIT (OUTPATIENT)
Dept: PLASTIC SURGERY | Facility: AMBULATORY SURGERY CENTER | Age: 42
End: 2022-12-09
Payer: COMMERCIAL

## 2022-12-09 DIAGNOSIS — Z98.890 HISTORY OF AUGMENTATION OF BOTH BREASTS: Primary | ICD-10-CM

## 2022-12-09 PROCEDURE — 99024 POSTOP FOLLOW-UP VISIT: CPT | Performed by: PLASTIC SURGERY

## 2022-12-09 NOTE — PROGRESS NOTES
Vivien is a 42 years old patient status post bilateral breast augmentation.  She is 2 days out from surgery.  Patient is feeling well.    At the physical exam, good symmetry bilaterally.  No evidence of hematoma.  Dressings are intact, no evidence of bleeding or infection.    Plan: Patient is doing well from plastic surgery standpoint, we will plan for a telehealth follow-up in 2 weeks.  Patient so far is happy with results.    Abdon Deleon MD , FACS   Diplomate American Board of Plastic Surgery  Diplomate American Board of Surgery  Adj. Assistant Professor of Surgery  Division of Plastic & Reconstructive Surgery   [facility]  Office: [phone]   12/9/2022 at 3:44 PM

## 2022-12-20 ENCOUNTER — VIRTUAL VISIT (OUTPATIENT)
Dept: PLASTIC SURGERY | Facility: AMBULATORY SURGERY CENTER | Age: 42
End: 2022-12-20
Payer: COMMERCIAL

## 2022-12-20 DIAGNOSIS — Z98.890 HISTORY OF AUGMENTATION OF BOTH BREASTS: Primary | ICD-10-CM

## 2022-12-20 PROCEDURE — 99024 POSTOP FOLLOW-UP VISIT: CPT | Performed by: PLASTIC SURGERY

## 2022-12-20 NOTE — NURSING NOTE
Call placed to patient to check in for virtual appointment. Patient is doing well overall. No complaints or concerns at this time. The patient has been informed that Dr. Deleon will be calling him between now and 4:00pm. Patient was agreeable to this plan.     Lucina Casanova RN on 12/20/2022 at 3:23 PM

## 2022-12-20 NOTE — PROGRESS NOTES
"  The patient has been notified of following:     \"This telephone visit will be conducted via a call between you and your physician/provider. We have found that certain health care needs can be provided without the need for a physical exam.  This service lets us provide the care you need with a short phone conversation.  If a prescription is necessary we can send it directly to your pharmacy.  If lab work is needed we can place an order for that and you can then stop by our lab to have the test done at a later time.    Telephone visits are billed at different rates depending on your insurance coverage. During this emergency period, for some insurers they may be billed the same as an in-person visit.  Please reach out to your insurance provider with any questions.    If during the course of the call the physician/provider feels a telephone visit is not appropriate, you will not be charged for this service.\"    Patient has given verbal consent to a Telephone visit? Yes    What phone number would you like to be contacted at? [phone]    Patient would like to receive their AVS by Yumber      Distant Location (provider location):  On-site    Additional provider notes: Vivien is a 42 years old patient is status post bilateral augmentation mammoplasty.  She is 2 weeks out from surgery.  She is doing well.  She expressed that she is happy with results.    At the physical exam via FaceTime, both breasts are healing well.  Inframammary fold incisions with no sign of infection or dehiscence.  Excellent symmetry bilaterally.    Plan: Continue sports bra for the next 4 weeks.  Avoid heavy lifting.  Follow-up with me next year.  Patient is doing excellent from plastic surgery standpoint.  She is happy with results and so am I.    Phone call duration: 10 minutes    Abdon Deleon MD , FACS   Diplomate American Board of Plastic Surgery  Diplomate American Board of Surgery  Adj. Assistant Professor of Surgery  Division of " Plastic & Reconstructive Surgery   [facility]  Office: [phone]   12/22/2022 at 7:38 PM

## 2023-02-03 ENCOUNTER — VIRTUAL VISIT (OUTPATIENT)
Dept: PLASTIC SURGERY | Facility: AMBULATORY SURGERY CENTER | Age: 43
End: 2023-02-03
Payer: COMMERCIAL

## 2023-02-03 DIAGNOSIS — Z98.82 STATUS POST BREAST AUGMENTATION: Primary | ICD-10-CM

## 2023-02-03 PROCEDURE — 99024 POSTOP FOLLOW-UP VISIT: CPT | Performed by: PLASTIC SURGERY

## 2023-02-03 NOTE — PROGRESS NOTES
"  The patient has been notified of following:     \"This telephone visit will be conducted via a call between you and your physician/provider. We have found that certain health care needs can be provided without the need for a physical exam.  This service lets us provide the care you need with a short phone conversation.  If a prescription is necessary we can send it directly to your pharmacy.  If lab work is needed we can place an order for that and you can then stop by our lab to have the test done at a later time.    Telephone visits are billed at different rates depending on your insurance coverage. During this emergency period, for some insurers they may be billed the same as an in-person visit.  Please reach out to your insurance provider with any questions.    If during the course of the call the physician/provider feels a telephone visit is not appropriate, you will not be charged for this service.\"    Patient has given verbal consent to a Telephone visit? Yes    What phone number would you like to be contacted at? [phone]    Patient would like to receive their AVS by [employer]    Are there any specific questions or needs that you would like addressed at your visit today? No        Distant Location (provider location):  On-site    Additional provider notes: Patient being seen virtually today after bilateral breast augmentation on 12/07/2022      Vivien 86 years old patient is status post bilateral augmentation mammoplasty.  She is 2 months out from surgery.  She is feeling fine.    At the physical exam done by video call, I consider the patient present with excellent symmetry bilaterally.  No evidence of late seroma or hematoma.  Bilateral inframammary fold incisions are well-healed.  There is no malposition.  There is no evidence of capsular contracture like Armenta 3 or 4.    Patient did mention that she feels that the right nipple is more lateral compared to the left one.  I did tell her that her " preoperative picture shows that her right nipple was more lateral than the left one and this is why she is noticing this discrepancy.  I told her that we do not do any nipple movement or repositioning during surgery.    I have told her that she can resume normal activities and to follow-up with me as needed.  Overall patient is happy with results and so am I.  Once again patient to follow-up with me as needed.    Phone call duration: 7 minutes    Abdon Deleon MD , FACS   Diplomate American Board of Plastic Surgery  Diplomate American Board of Surgery  Adj. Assistant Professor of Surgery  Division of Plastic & Reconstructive Surgery   [facility]  Office: [phone]   2/3/2023 at 2:03 PM

## 2023-07-02 ENCOUNTER — HEALTH MAINTENANCE LETTER (OUTPATIENT)
Age: 43
End: 2023-07-02

## 2023-07-07 ENCOUNTER — TRANSFERRED RECORDS (OUTPATIENT)
Dept: HEALTH INFORMATION MANAGEMENT | Facility: CLINIC | Age: 43
End: 2023-07-07

## 2023-08-04 ENCOUNTER — MEDICAL CORRESPONDENCE (OUTPATIENT)
Dept: HEALTH INFORMATION MANAGEMENT | Facility: CLINIC | Age: 43
End: 2023-08-04
Payer: COMMERCIAL

## 2023-08-04 ENCOUNTER — TRANSFERRED RECORDS (OUTPATIENT)
Dept: HEALTH INFORMATION MANAGEMENT | Facility: CLINIC | Age: 43
End: 2023-08-04
Payer: COMMERCIAL

## 2023-08-07 ENCOUNTER — TRANSCRIBE ORDERS (OUTPATIENT)
Dept: OTHER | Age: 43
End: 2023-08-07

## 2023-08-07 DIAGNOSIS — M54.41 ACUTE RIGHT-SIDED LOW BACK PAIN WITH RIGHT-SIDED SCIATICA: Primary | ICD-10-CM

## 2023-08-25 ENCOUNTER — TRANSFERRED RECORDS (OUTPATIENT)
Dept: HEALTH INFORMATION MANAGEMENT | Facility: CLINIC | Age: 43
End: 2023-08-25
Payer: COMMERCIAL

## 2024-02-10 ENCOUNTER — APPOINTMENT (OUTPATIENT)
Dept: GENERAL RADIOLOGY | Facility: CLINIC | Age: 44
End: 2024-02-10
Attending: EMERGENCY MEDICINE
Payer: COMMERCIAL

## 2024-02-10 ENCOUNTER — HOSPITAL ENCOUNTER (EMERGENCY)
Facility: CLINIC | Age: 44
Discharge: HOME | End: 2024-02-10
Attending: EMERGENCY MEDICINE | Admitting: EMERGENCY MEDICINE
Payer: COMMERCIAL

## 2024-02-10 VITALS
HEIGHT: 74 IN | WEIGHT: 224 LBS | DIASTOLIC BLOOD PRESSURE: 100 MMHG | OXYGEN SATURATION: 97 % | RESPIRATION RATE: 18 BRPM | TEMPERATURE: 98.5 F | SYSTOLIC BLOOD PRESSURE: 134 MMHG | BODY MASS INDEX: 28.75 KG/M2 | HEART RATE: 81 BPM

## 2024-02-10 DIAGNOSIS — S61.412A LACERATION OF LEFT HAND, FOREIGN BODY PRESENCE UNSPECIFIED, INITIAL ENCOUNTER: ICD-10-CM

## 2024-02-10 PROCEDURE — 12042 INTMD RPR N-HF/GENIT2.6-7.5: CPT | Mod: LT | Performed by: EMERGENCY MEDICINE

## 2024-02-10 PROCEDURE — 99284 EMERGENCY DEPT VISIT MOD MDM: CPT | Mod: 25 | Performed by: EMERGENCY MEDICINE

## 2024-02-10 PROCEDURE — 250N000009 HC RX 250: Performed by: EMERGENCY MEDICINE

## 2024-02-10 PROCEDURE — 99283 EMERGENCY DEPT VISIT LOW MDM: CPT | Mod: 25 | Performed by: EMERGENCY MEDICINE

## 2024-02-10 PROCEDURE — 73130 X-RAY EXAM OF HAND: CPT | Mod: LT

## 2024-02-10 RX ORDER — LIDOCAINE HYDROCHLORIDE AND EPINEPHRINE 10; 10 MG/ML; UG/ML
10 INJECTION, SOLUTION INFILTRATION; PERINEURAL ONCE
Status: COMPLETED | OUTPATIENT
Start: 2024-02-10 | End: 2024-02-10

## 2024-02-10 RX ADMIN — LIDOCAINE HYDROCHLORIDE AND EPINEPHRINE 10 ML: 10; 10 INJECTION, SOLUTION INFILTRATION; PERINEURAL at 04:12

## 2024-02-10 ASSESSMENT — ACTIVITIES OF DAILY LIVING (ADL)
ADLS_ACUITY_SCORE: 35
ADLS_ACUITY_SCORE: 33

## 2024-02-10 NOTE — DISCHARGE INSTRUCTIONS
Your wound was closed with 3 absorbable and 14 nonabsorbable sutures.  The nonabsorbable sutures will need to be removed in 14 days.  You will need to follow-up with your doctor, urgent care, or return to the ED to have this done.  The meantime, keep wound clean with soap and water.  Can be covered as needed.  Apply topical antibiotic ointment for the first 3 days and then daily with Aquaphor or other petroleum based ointment.  Monitor for signs of infection such as redness, increased pain, pus drainage, or fever.

## 2024-02-10 NOTE — ED TRIAGE NOTES
Triage Assessment (Adult)       Row Name 02/10/24 0024          Triage Assessment    Airway WDL WDL        Respiratory WDL    Respiratory WDL WDL        Skin Circulation/Temperature WDL    Skin Circulation/Temperature WDL WDL        Cardiac WDL    Cardiac WDL WDL        Peripheral/Neurovascular WDL    Peripheral Neurovascular WDL WDL        Cognitive/Neuro/Behavioral WDL    Cognitive/Neuro/Behavioral WDL WDL

## 2024-02-10 NOTE — ED PROVIDER NOTES
"ED Provider Note  [facility]      History     Chief Complaint   Patient presents with    Laceration     Left palm laceration. Grabbed knife the \"wrong way\". Has not unwrapped since injury occurred     The history is provided by the patient and medical records.     Vishal Dia is a 43 year old adult who presents the emergency department for left palm laceration.    Approximately an hour and a half ago, patient grabbed a knife by the blade, and has a laceration on their left palm near the pinky.  Patient states they have had it wrapped since the injury.  Patient denies sensory deficits.  Patient states they have broken their left hand multiple times.  Patient denies any chronic health issues.  Tetanus is uptodate.    Past Medical History  History reviewed. No pertinent past medical history.  Past Surgical History:   Procedure Laterality Date    ADJACENT TISSUE TRANSFER, FOREHEAD/CHEEKS/CHIN/MOUTH/NECK/AXILLAE/GENITALIA/HANDS/FEET, < OR = 10CM Bilateral 2022    Procedure: ADJACENT TISSUE TRANSFER OR REARRANGEMENT, BILATERAL INFRAMMAMARY FOLDS: 12 sq CM RIGHT INFRAMMAMARY FOLD and 24 sq CM LEFT INFRAMMAMARY FOLD;  Surgeon: Abdon Deleon MD;  Location: [facility] [place] OR    MAMMOPLASTY AUGMENTATION Bilateral 2022    Procedure: BILATERAL BREAST AUGMENTATION;  Surgeon: Abdon Deleon MD;  Location: [facility] [place] OR     docusate sodium (COLACE) 100 MG capsule  ondansetron (ZOFRAN) 4 MG tablet      Allergies   Allergen Reactions    Codeine     Demerol Hcl [Meperidine]     Morphine      Family History  No family history on file.  Social History   Social History     Tobacco Use    Smoking status: Former     Types: Cigarettes     Quit date: 3/1/2022     Years since quittin.9    Smokeless tobacco: Never         A medically appropriate review of systems was performed with pertinent positives and negatives noted in the HPI, and all other systems negative.    Physical Exam   BP: 117/80  Pulse: " "120  Temp: 98.7  F (37.1  C)  Resp: 16  Height: 188 cm (6' 2\")  Weight: 101.6 kg (224 lb)  SpO2: 99 %  Physical Exam  Vitals and nursing note reviewed.   Constitutional:       General: She is not in acute distress.     Appearance: Normal appearance.   HENT:      Head: Normocephalic.      Nose: Nose normal.   Eyes:      Pupils: Pupils are equal, round, and reactive to light.   Cardiovascular:      Rate and Rhythm: Normal rate and regular rhythm.   Pulmonary:      Effort: Pulmonary effort is normal.   Abdominal:      General: There is no distension.   Musculoskeletal:         General: No deformity. Normal range of motion.        Hands:       Cervical back: Normal range of motion.      Comments: Large laceration at the base of the palmar aspect of the fifth digit.  Adipose tissue exposed.  No obvious bone/tendon involvement.  Normal cap refill.  Normal sensation.  Flexion/extension of all digits is intact.  No foreign bodies   Skin:     General: Skin is warm.   Neurological:      Mental Status: She is alert and oriented to person, place, and time.   Psychiatric:         Mood and Affect: Mood normal.           ED Course, Procedures, & Data      [facility]    -Laceration Repair    Date/Time: 2/11/2024 3:48 AM    Performed by: Perry Watkins DO  Authorized by: Perry Watkins DO    Risks, benefits and alternatives discussed.      ANESTHESIA (see MAR for exact dosages):     Anesthesia method:  Local infiltration    Local anesthetic:  Lidocaine 1% WITH epi  LACERATION DETAILS     Location:  Hand    Hand location:  L palm    Length (cm):  7    Depth (mm):  5    REPAIR TYPE:     Repair type:  Intermediate    EXPLORATION:     Hemostasis achieved with:  Epinephrine and direct pressure    Wound exploration: wound explored through full range of motion and entire depth of wound probed and visualized      Wound extent: no signs of injury, no nerve damage, no tendon " damage, no underlying fracture and no vascular damage      Contaminated: no      TREATMENT:     Area cleansed with:  Betadine, soap and water and saline    Amount of cleaning:  Extensive    Irrigation solution:  Sterile saline    Irrigation volume:  1000    Irrigation method:  Syringe    Visualized foreign bodies/material removed: no      MUCOUS MEMBRANE REPAIR     Suture size:  4-0    Suture material:  Vicryl    Suture technique:  Simple interrupted    Number of sutures:  3    SKIN REPAIR     Repair method:  Sutures    Suture size:  4-0    Suture material:  Prolene    Suture technique:  Simple interrupted    Number of sutures:  14    APPROXIMATION     Approximation:  Close    POST-PROCEDURE DETAILS     Dressing:  Antibiotic ointment and non-adherent dressing              Results for orders placed or performed during the hospital encounter of 02/10/24   XR Hand Left 3 Views     Status: None    Narrative    EXAM: XR HAND LEFT G/E 3 VIEWS  LOCATION: [facility]  DATE: 2/10/2024    INDICATION: laceration base of L pinky, possible hand involvement  COMPARISON: None.      Impression    IMPRESSION: There is a soft tissue laceration seen at the base of the fifth finger adjacent to the fifth MCP joint. No radiopaque foreign bodies are identified. No acute fracture or malalignment. Joint     Medications   lidocaine 1% with EPINEPHrine 1:100,000 injection 10 mL (10 mLs Intradermal $Given 2/10/24 4139)     Labs Ordered and Resulted from Time of ED Arrival to Time of ED Departure - No data to display  XR Hand Left 3 Views   Final Result   IMPRESSION: There is a soft tissue laceration seen at the base of the fifth finger adjacent to the fifth MCP joint. No radiopaque foreign bodies are identified. No acute fracture or malalignment. Joint             Critical care was not performed.     Medical Decision Making  The patient's presentation was of high complexity (an acute health issue  posing potential threat to life or bodily function).    The patient's evaluation involved:  ordering and/or review of 2 test(s) in this encounter (independent review of x-ray.  Reviewing [facility] for tetanus data.)    The patient's management necessitated moderate risk (a decision regarding minor procedure (laceration repair) with risk factors of none).    Assessment & Plan    Independent review of x-ray is negative for osseous injury, foreign bodies, or other complication.  On exam, no evidence of muscle, tendon, or significant nerve involvement.  Large laceration was repaired with 3 deep/absorbable sutures and 14 nonabsorbable sutures to be removed in the next 14 days.  Tetanus is up-to-date.  Discussed wound care at home.  Educated on signs of infection for which to monitor and reasons to return to the ED.     I have reviewed the nursing notes. I have reviewed the findings, diagnosis, plan and need for follow up with the patient.    Discharge Medication List as of 2/10/2024  4:13 AM          Final diagnoses:   Laceration of left hand, foreign body presence unspecified, initial encounter   IBulmaro, randy serving as a trained medical scribe to document services personally performed by Perry Watkins DO, based to the provider's statements to me.     I, Perry Watkins DO, was physically present and have reviewed and verified the accuracy of this note documented by Bulmaro Mason.       Perry Watkins DO  [facility] EMERGENCY DEPARTMENT  2/10/2024     Perry Watkins DO  02/11/24 0354

## 2024-08-25 ENCOUNTER — HEALTH MAINTENANCE LETTER (OUTPATIENT)
Age: 44
End: 2024-08-25

## (undated) DEVICE — SU MONOCRYL 5-0 P-3 18" UND Y493G

## (undated) DEVICE — ADH LIQUID MASTISOL TOPICAL VIAL 2-3ML 0523-48

## (undated) DEVICE — TAPE MICROFOAM 3" 1528-3

## (undated) DEVICE — SU VICRYL 3-0 SH CR 8X18" J774

## (undated) DEVICE — ESU ELEC BLADE 2.75" COATED/INSULATED E1455

## (undated) DEVICE — BNDG ELASTIC 6" DBL LENGTH UNSTERILE 6611-16

## (undated) DEVICE — SOL NACL 0.9% IRRIG 500ML BOTTLE 2F7123

## (undated) DEVICE — DRAPE LAP W/ARMBOARD 29410

## (undated) DEVICE — GLOVE PROTEXIS BLUE W/NEU-THERA 7.0  2D73EB70

## (undated) DEVICE — DRAPE SHEET REV FOLD 3/4 9349

## (undated) DEVICE — PREP CHLORHEXIDINE 4% 4OZ (HIBICLENS) 57504

## (undated) DEVICE — ADH SKIN CLOSURE PREMIERPRO EXOFIN 1.0ML 3470

## (undated) DEVICE — PREP CHLORAPREP 26ML TINTED ORANGE  260815

## (undated) DEVICE — DECANTER VIAL 2006S

## (undated) DEVICE — STPL SKIN 35W 059037

## (undated) DEVICE — SU PDS II 2-0 CT-1 27" Z339H

## (undated) DEVICE — BOWL 32OZ STERILE 01232

## (undated) DEVICE — LABEL MEDICATION SYSTEM 3303-P

## (undated) DEVICE — PACK MINOR CUSTOM ASC

## (undated) DEVICE — GLOVE PROTEXIS BLUE W/NEU-THERA 8.0  2D73EB80

## (undated) DEVICE — LINEN TOWEL PACK X5 5464

## (undated) DEVICE — DRAPE IOBAN INCISE 23X17" 6650EZ

## (undated) DEVICE — BNDG ELASTIC 6"X5YDS UNSTERILE 6611-60

## (undated) DEVICE — GLOVE GAMMEX NEOPRENE ULTRA SZ 7.5 LF 8515

## (undated) DEVICE — GLOVE PROTEXIS MICRO 6.5  2D73PM65

## (undated) DEVICE — ESU GROUND PAD ADULT W/CORD E7507

## (undated) DEVICE — SPONGE LAP 18X18" X8435

## (undated) DEVICE — DRSG TEGADERM 4X4 3/4" 1626W

## (undated) DEVICE — BASIN SET MINOR DISP

## (undated) DEVICE — SU MONOCRYL 4-0 PS-2 27" UND Y426H

## (undated) DEVICE — ESU CLEANER TIP 31142717

## (undated) DEVICE — SYR BULB IRRIG 50ML LATEX FREE 0035280

## (undated) DEVICE — DRSG STERI STRIP 1/2X4" R1547

## (undated) DEVICE — ESU PENCIL SMOKE EVAC W/ROCKER SWITCH 0703-047-000

## (undated) DEVICE — ESU ELEC BLADE 6" COATED/INSULATED E1455-6

## (undated) DEVICE — PHOTON GUIDE INVUITY WIDE FLAT 104015

## (undated) DEVICE — DRSG GAUZE 2X2" 8042

## (undated) DEVICE — SUCTION TIP YANKAUER STR K87

## (undated) DEVICE — SOL WATER IRRIG 500ML BOTTLE 2F7113

## (undated) RX ORDER — ONDANSETRON 2 MG/ML
INJECTION INTRAMUSCULAR; INTRAVENOUS
Status: DISPENSED
Start: 2022-12-07

## (undated) RX ORDER — GENTAMICIN 40 MG/ML
INJECTION, SOLUTION INTRAMUSCULAR; INTRAVENOUS
Status: DISPENSED
Start: 2022-12-07

## (undated) RX ORDER — HYDROMORPHONE HYDROCHLORIDE 1 MG/ML
INJECTION, SOLUTION INTRAMUSCULAR; INTRAVENOUS; SUBCUTANEOUS
Status: DISPENSED
Start: 2022-12-07

## (undated) RX ORDER — PROPOFOL 10 MG/ML
INJECTION, EMULSION INTRAVENOUS
Status: DISPENSED
Start: 2022-12-07

## (undated) RX ORDER — DEXAMETHASONE SODIUM PHOSPHATE 4 MG/ML
INJECTION, SOLUTION INTRA-ARTICULAR; INTRALESIONAL; INTRAMUSCULAR; INTRAVENOUS; SOFT TISSUE
Status: DISPENSED
Start: 2022-12-07

## (undated) RX ORDER — ACETAMINOPHEN 325 MG/1
TABLET ORAL
Status: DISPENSED
Start: 2022-12-07

## (undated) RX ORDER — GABAPENTIN 300 MG/1
CAPSULE ORAL
Status: DISPENSED
Start: 2022-12-07

## (undated) RX ORDER — FENTANYL CITRATE 50 UG/ML
INJECTION, SOLUTION INTRAMUSCULAR; INTRAVENOUS
Status: DISPENSED
Start: 2022-12-07

## (undated) RX ORDER — CEFAZOLIN SODIUM 1 G/3ML
INJECTION, POWDER, FOR SOLUTION INTRAMUSCULAR; INTRAVENOUS
Status: DISPENSED
Start: 2022-12-07

## (undated) RX ORDER — DEXMEDETOMIDINE HYDROCHLORIDE 4 UG/ML
INJECTION, SOLUTION INTRAVENOUS
Status: DISPENSED
Start: 2022-12-07

## (undated) RX ORDER — GLYCOPYRROLATE 0.2 MG/ML
INJECTION INTRAMUSCULAR; INTRAVENOUS
Status: DISPENSED
Start: 2022-12-07

## (undated) RX ORDER — CEFAZOLIN SODIUM 2 G/50ML
SOLUTION INTRAVENOUS
Status: DISPENSED
Start: 2022-12-07

## (undated) RX ORDER — TRANEXAMIC ACID 100 MG/ML
INJECTION, SOLUTION INTRAVENOUS
Status: DISPENSED
Start: 2022-12-07